# Patient Record
Sex: MALE | Race: WHITE | NOT HISPANIC OR LATINO | Employment: STUDENT | URBAN - METROPOLITAN AREA
[De-identification: names, ages, dates, MRNs, and addresses within clinical notes are randomized per-mention and may not be internally consistent; named-entity substitution may affect disease eponyms.]

---

## 2019-01-22 ENCOUNTER — TELEPHONE (OUTPATIENT)
Dept: FAMILY MEDICINE CLINIC | Facility: CLINIC | Age: 19
End: 2019-01-22

## 2019-01-22 NOTE — TELEPHONE ENCOUNTER
Patient would like to re-establish for mono testing.  Last seen May 2015.  2000.  [phone]. [facility] [address] [place] [id number]

## 2019-02-26 ENCOUNTER — OFFICE VISIT (OUTPATIENT)
Dept: FAMILY MEDICINE CLINIC | Facility: CLINIC | Age: 19
End: 2019-02-26

## 2019-02-26 VITALS
WEIGHT: 141.5 LBS | RESPIRATION RATE: 17 BRPM | HEIGHT: 70 IN | DIASTOLIC BLOOD PRESSURE: 80 MMHG | SYSTOLIC BLOOD PRESSURE: 118 MMHG | OXYGEN SATURATION: 98 % | TEMPERATURE: 98.4 F | HEART RATE: 103 BPM | BODY MASS INDEX: 20.26 KG/M2

## 2019-02-26 DIAGNOSIS — J06.9 UPPER RESPIRATORY TRACT INFECTION, UNSPECIFIED TYPE: ICD-10-CM

## 2019-02-26 DIAGNOSIS — R53.83 FATIGUE, UNSPECIFIED TYPE: Primary | ICD-10-CM

## 2019-02-26 PROCEDURE — 90632 HEPA VACCINE ADULT IM: CPT | Performed by: FAMILY MEDICINE

## 2019-02-26 PROCEDURE — 90471 IMMUNIZATION ADMIN: CPT | Performed by: FAMILY MEDICINE

## 2019-02-26 PROCEDURE — 99203 OFFICE O/P NEW LOW 30 MIN: CPT | Performed by: FAMILY MEDICINE

## 2019-02-27 LAB
25(OH)D3+25(OH)D2 SERPL-MCNC: 28.1 NG/ML
ALBUMIN SERPL-MCNC: 4.7 G/DL (ref 3.5–5.2)
ALBUMIN/GLOB SERPL: 1.7 G/DL
ALP SERPL-CCNC: 88 U/L (ref 40–129)
ALT SERPL-CCNC: 8 U/L (ref 5–41)
AST SERPL-CCNC: 18 U/L (ref 5–40)
BASOPHILS # BLD AUTO: 0.04 10*3/MM3 (ref 0–0.2)
BASOPHILS NFR BLD AUTO: 0.7 % (ref 0–1.5)
BILIRUB SERPL-MCNC: 0.5 MG/DL (ref 0.2–1.2)
BUN SERPL-MCNC: 10 MG/DL (ref 6–20)
BUN/CREAT SERPL: 12.2 (ref 7–25)
CALCIUM SERPL-MCNC: 9.4 MG/DL (ref 8.6–10.5)
CHLORIDE SERPL-SCNC: 102 MMOL/L (ref 98–107)
CO2 SERPL-SCNC: 29.5 MMOL/L (ref 22–29)
CREAT SERPL-MCNC: 0.82 MG/DL (ref 0.76–1.27)
EBV EA IGG SER-ACNC: <9 U/ML (ref 0–8.9)
EBV NA IGG SER IA-ACNC: <18 U/ML (ref 0–17.9)
EBV VCA IGG SER IA-ACNC: <18 U/ML (ref 0–17.9)
EBV VCA IGM SER IA-ACNC: <36 U/ML (ref 0–35.9)
EOSINOPHIL # BLD AUTO: 0.2 10*3/MM3 (ref 0–0.4)
EOSINOPHIL NFR BLD AUTO: 3.4 % (ref 0.3–6.2)
ERYTHROCYTE [DISTWIDTH] IN BLOOD BY AUTOMATED COUNT: 12.6 % (ref 12.3–15.4)
GLOBULIN SER CALC-MCNC: 2.7 GM/DL
GLUCOSE SERPL-MCNC: 65 MG/DL (ref 65–99)
HCT VFR BLD AUTO: 47.7 % (ref 37.5–51)
HGB BLD-MCNC: 16.2 G/DL (ref 13–17.7)
IMM GRANULOCYTES # BLD AUTO: 0.01 10*3/MM3 (ref 0–0.05)
IMM GRANULOCYTES NFR BLD AUTO: 0.2 % (ref 0–0.5)
LYMPHOCYTES # BLD AUTO: 1.57 10*3/MM3 (ref 0.7–3.1)
LYMPHOCYTES NFR BLD AUTO: 26.6 % (ref 19.6–45.3)
MCH RBC QN AUTO: 29.6 PG (ref 26.6–33)
MCHC RBC AUTO-ENTMCNC: 34 G/DL (ref 31.5–35.7)
MCV RBC AUTO: 87 FL (ref 79–97)
MONOCYTES # BLD AUTO: 0.71 10*3/MM3 (ref 0.1–0.9)
MONOCYTES NFR BLD AUTO: 12 % (ref 5–12)
NEUTROPHILS # BLD AUTO: 3.37 10*3/MM3 (ref 1.4–7)
NEUTROPHILS NFR BLD AUTO: 57.1 % (ref 42.7–76)
PLATELET # BLD AUTO: 225 10*3/MM3 (ref 140–450)
POTASSIUM SERPL-SCNC: 4.6 MMOL/L (ref 3.5–5.2)
PROT SERPL-MCNC: 7.4 G/DL (ref 6–8.5)
RBC # BLD AUTO: 5.48 10*6/MM3 (ref 4.14–5.8)
SERVICE CMNT-IMP: NORMAL
SODIUM SERPL-SCNC: 141 MMOL/L (ref 136–145)
WBC # BLD AUTO: 5.9 10*3/MM3 (ref 3.4–10.8)

## 2019-02-27 NOTE — PROGRESS NOTES
Subjective   Juan Manuel Elias is a 18 y.o. male with   Chief Complaint   Patient presents with   • Establish Care   • Labs Only     Mono Testing   .    History of Present Illness   18-year-old white male who has not been seen in this office for several years here with complaint of increased fatigue and exposure to infectious mononucleosis.  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  Health questionnaire is completed in its entirety on this date and reviewed.  Apparently he has had sore throat in the recent past but has had no fever, abdominal pain, nausea, vomiting or diarrhea.  The following portions of the patient's history were reviewed and updated as appropriate: allergies, current medications, past family history, past medical history, past social history, past surgical history and problem list.    Review of Systems   Constitutional: Positive for fatigue.   HENT: Positive for sore throat.    Respiratory: Negative for cough and shortness of breath.    Gastrointestinal: Negative for abdominal pain.   Musculoskeletal: Negative for myalgias.   All other systems reviewed and are negative.      Objective     Vitals:    02/26/19 1026   BP: 118/80   Pulse: 103   Resp: 17   Temp: 98.4 °F (36.9 °C)   SpO2: 98%       No results found for this or any previous visit (from the past 168 hour(s)).    Physical Exam   Constitutional: He is oriented to person, place, and time. He appears well-developed and well-nourished.   HENT:   Head: Normocephalic and atraumatic.   Right Ear: Hearing, tympanic membrane, external ear and ear canal normal.   Left Ear: Hearing, tympanic membrane, external ear and ear canal normal.   Nose: Nose normal.   Mouth/Throat: Uvula is midline, oropharynx is clear and moist and mucous membranes are normal.   Neck: Trachea normal and phonation normal. Neck supple. Normal carotid pulses present. Carotid bruit is not present. No thyroid mass and no thyromegaly present.    Cardiovascular: Normal rate, regular rhythm and normal heart sounds. Exam reveals no gallop and no friction rub.   No murmur heard.  Pulmonary/Chest: Effort normal and breath sounds normal. No respiratory distress. He has no decreased breath sounds. He has no wheezes. He has no rhonchi. He has no rales.   Lymphadenopathy:     He has no cervical adenopathy.   Neurological: He is alert and oriented to person, place, and time.   Skin: Skin is warm and dry. No rash noted.   Psychiatric: He has a normal mood and affect. His speech is normal and behavior is normal. Judgment and thought content normal. Cognition and memory are normal.   Nursing note and vitals reviewed.      Assessment/Plan   Juan Manuel was seen today for establish care and labs only.    Diagnoses and all orders for this visit:    Fatigue, unspecified type  -     CBC & Differential  -     Comprehensive Metabolic Panel  -     Vitamin D 25 Hydroxy  -     Santana-Barr Virus VCA Antibody Panel    Upper respiratory tract infection, unspecified type  -     CBC & Differential  -     Comprehensive Metabolic Panel  -     Vitamin D 25 Hydroxy  -     Santana-Barr Virus VCA Antibody Panel    Other orders  -     Hepatitis A Vaccine Adult IM        Return if symptoms worsen or fail to improve.

## 2023-11-07 ENCOUNTER — TELEPHONE (OUTPATIENT)
Dept: FAMILY MEDICINE CLINIC | Facility: CLINIC | Age: 23
End: 2023-11-07

## 2023-11-07 NOTE — TELEPHONE ENCOUNTER
Caller: Juan Manuel Elias    Relationship to patient: Self    Best call back number: [phone]    Chief complaint: GENERAL CHECK-UP    Type of visit: NEW PATIENT    If rescheduling, when is the original appointment: 11/20/2023 WITH MINNA BRENNAN HEAD.     Additional notes: THE PATIENT WAS PREVIOUSLY ESTABLISHED WITH DR. HIGUERA. HOWEVER, THE PATIENT HAS NOT BEEN SEEN IN OVER 3 YEARS. THE PATIENT WOULD LIKE TO KNOW IF DR. HIGUERA WOULD CONSIDER TAKING HIM BACK ON AS A NEW PATIENT. PLEASE ADVISE.

## 2023-11-20 ENCOUNTER — OFFICE VISIT (OUTPATIENT)
Dept: FAMILY MEDICINE CLINIC | Facility: CLINIC | Age: 23
End: 2023-11-20
Payer: COMMERCIAL

## 2023-11-20 ENCOUNTER — PATIENT ROUNDING (BHMG ONLY) (OUTPATIENT)
Dept: FAMILY MEDICINE CLINIC | Facility: CLINIC | Age: 23
End: 2023-11-20

## 2023-11-20 VITALS
WEIGHT: 149 LBS | DIASTOLIC BLOOD PRESSURE: 80 MMHG | HEART RATE: 100 BPM | TEMPERATURE: 97.5 F | BODY MASS INDEX: 21.33 KG/M2 | SYSTOLIC BLOOD PRESSURE: 128 MMHG | OXYGEN SATURATION: 98 % | HEIGHT: 70 IN

## 2023-11-20 DIAGNOSIS — R53.83 FATIGUE, UNSPECIFIED TYPE: ICD-10-CM

## 2023-11-20 DIAGNOSIS — Z13.29 THYROID DISORDER SCREENING: ICD-10-CM

## 2023-11-20 DIAGNOSIS — Z76.89 ENCOUNTER TO ESTABLISH CARE: Primary | ICD-10-CM

## 2023-11-20 DIAGNOSIS — S80.862A TICK BITE OF LEFT LOWER LEG, INITIAL ENCOUNTER: ICD-10-CM
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
## 2023-11-20 DIAGNOSIS — Z13.220 SCREENING FOR LIPID DISORDERS: ICD-10-CM

## 2023-11-20 NOTE — PROGRESS NOTES
Patient ID: Juan Manuel Elias is a 23 y.o. male     Patient Care Team:  Head, MINNA Butts as PCP - General (Nurse Practitioner)    Subjective     Chief Complaint   Patient presents with    Establish Care    Fatigue     X 2 months        Juan Manuel Elias presents to [facility] today to establish care with our office.  Previously seen Dr. Mehta years ago.       Fatigue  This is a new problem. The current episode started more than 1 month ago. The problem occurs daily. The problem has been unchanged. Associated symptoms include fatigue. Pertinent negatives include no anorexia, chills, coughing, fever, headaches, nausea, rash or sore throat. Nothing aggravates the symptoms.     Tick bite to left lower leg which occurred on 6/4/23.  Removed tick same day.      Gets about 8 hours of sleep each night. Will wake up tired and progresses throughout the day.  Does not feel well rested.  Feels like he needs a nap most days around 2-3 pm.    Unaware if snores.      Feels he has suffered from seasonal depression in the past however current symptoms started prior to when the weather changed which is unusually.  He does not feel he needs medication or counseling for depression at this time.      Diet:  Nothing specific.  Gets about 64 ounces of water most day.      Works at [employer].  .       He denies any complaints of fever, chills, cough, chest pain, shortness of air, abdominal pain, nausea, or any other concerns.     The following portions of the patient's history were reviewed and updated as appropriate: allergies, current medications, past family history, past medical history, past social history, past surgical history and problem list.       Review of Systems   Constitutional: Positive for fatigue. Negative for chills and fever.   HENT:  Negative for sore throat.    Respiratory:  Negative for cough.    Skin:  Negative for rash.   Gastrointestinal:  Negative for anorexia and  nausea.   Neurological:  Negative for headaches.       Vitals:    11/20/23 0916   BP: 128/80   Pulse: 100   Temp: 97.5 °F (36.4 °C)   SpO2: 98%       Documented weights    11/20/23 0916   Weight: 67.6 kg (149 lb)     Body mass index is 21.69 kg/m².    Results for orders placed or performed in visit on 02/26/19   Comprehensive Metabolic Panel    Specimen: Blood   Result Value Ref Range    Glucose 65 65 - 99 mg/dL    BUN 10 6 - 20 mg/dL    Creatinine 0.82 0.76 - 1.27 mg/dL    eGFR Non African Am 120 >60 mL/min/1.73    eGFR African Am 145 >60 mL/min/1.73    BUN/Creatinine Ratio 12.2 7.0 - 25.0    Sodium 141 136 - 145 mmol/L    Potassium 4.6 3.5 - 5.2 mmol/L    Chloride 102 98 - 107 mmol/L    Total CO2 29.5 (H) 22.0 - 29.0 mmol/L    Calcium 9.4 8.6 - 10.5 mg/dL    Total Protein 7.4 6.0 - 8.5 g/dL    Albumin 4.70 3.50 - 5.20 g/dL    Globulin 2.7 gm/dL    A/G Ratio 1.7 g/dL    Total Bilirubin 0.5 0.2 - 1.2 mg/dL    Alkaline Phosphatase 88 40 - 129 U/L    AST (SGOT) 18 5 - 40 U/L    ALT (SGPT) 8 5 - 41 U/L   Vitamin D 25 Hydroxy    Specimen: Blood   Result Value Ref Range    25 Hydroxy, Vitamin D 28.1 ng/ml   Santana-Barr Virus VCA Antibody Panel    Specimen: Blood   Result Value Ref Range    EBV VCA IgM <36.0 0.0 - 35.9 U/mL    EBV Early Antigen Ab, IgG <9.0 0.0 - 8.9 U/mL    EBV VCA IgG <18.0 0.0 - 17.9 U/mL    EBV Nuclear Antigen Ab, IgG <18.0 0.0 - 17.9 U/mL    Interpretation Comment    CBC & Differential    Specimen: Blood   Result Value Ref Range    WBC 5.90 3.40 - 10.80 10*3/mm3    RBC 5.48 4.14 - 5.80 10*6/mm3    Hemoglobin 16.2 13.0 - 17.7 g/dL    Hematocrit 47.7 37.5 - 51.0 %    MCV 87.0 79.0 - 97.0 fL    MCH 29.6 26.6 - 33.0 pg    MCHC 34.0 31.5 - 35.7 g/dL    RDW 12.6 12.3 - 15.4 %    Platelets 225 140 - 450 10*3/mm3    Neutrophil Rel % 57.1 42.7 - 76.0 %    Lymphocyte Rel % 26.6 19.6 - 45.3 %    Monocyte Rel % 12.0 5.0 - 12.0 %    Eosinophil Rel % 3.4 0.3 - 6.2 %    Basophil Rel % 0.7 0.0 - 1.5 %     Neutrophils Absolute 3.37 1.40 - 7.00 10*3/mm3    Lymphocytes Absolute 1.57 0.70 - 3.10 10*3/mm3    Monocytes Absolute 0.71 0.10 - 0.90 10*3/mm3    Eosinophils Absolute 0.20 0.00 - 0.40 10*3/mm3    Basophils Absolute 0.04 0.00 - 0.20 10*3/mm3    Immature Granulocyte Rel % 0.2 0.0 - 0.5 %    Immature Grans Absolute 0.01 0.00 - 0.05 10*3/mm3     Patient screened positive for depression based on a PHQ-9 score of 9 on 11/20/2023. Follow-up recommendations include: Elevated PHQ score reflective of acute illness, not depression.        Objective     Physical Exam  Vitals reviewed.   Constitutional:       General: He is not in acute distress.     Appearance: He is well-developed.   HENT:      Head: Normocephalic and atraumatic.      Right Ear: Tympanic membrane normal.      Left Ear: Tympanic membrane normal.   Eyes:      Pupils: Pupils are equal, round, and reactive to light.   Cardiovascular:      Rate and Rhythm: Normal rate and regular rhythm.      Heart sounds: Normal heart sounds. No murmur heard.  Pulmonary:      Effort: Pulmonary effort is normal.      Breath sounds: Normal breath sounds. No wheezing, rhonchi or rales.   Abdominal:      Palpations: Abdomen is soft.   Musculoskeletal:         General: No tenderness. Normal range of motion.      Cervical back: Normal range of motion and neck supple.   Skin:     General: Skin is warm and dry.      Comments: 5 mm pink scar area to medial left lower leg   Neurological:      Mental Status: He is alert and oriented to person, place, and time.   Psychiatric:         Behavior: Behavior normal.       BMI is within normal parameters. No other follow-up for BMI required.    Assessment & Plan     Assessment/Plan     Diagnoses and all orders for this visit:    1. Encounter to establish care (Primary)    2. Fatigue, unspecified type  -     CBC (No Diff)  -     Comprehensive Metabolic Panel  -     Lipid Panel With / Chol / HDL Ratio  -     TSH Rfx On Abnormal To Free T4  -     UA  / M With / Rflx Culture([facility] ONLY) - Urine, Clean Catch  -     SARS-CoV-2 Antibodies, Nucleocapsid (Natural Immunity)  -     Lyme Disease Total Antibody With Reflex to Immunoassay    3. Tick bite of left lower leg, initial encounter  -     Lyme Disease Total Antibody With Reflex to Immunoassay    4. Screening for lipid disorders  -     Lipid Panel With / Chol / HDL Ratio    5. Thyroid disorder screening  -     TSH Rfx On Abnormal To Free T4      Summary:  Juan Manuel Elias has been suffering from chronic fatigue for the past couple of months.  Advised we will start off with checking blood work first to determine further recommendations.    Follow Up:  Return if symptoms worsen or fail to improve, for Labs today.    In the meantime, instructed to contact us sooner for any problems or concerns.    Patient was given instructions and counseling regarding condition or for health maintenance advice.  Please see specific information pulled into the AVS if appropriate.          Carmen Garcia, APRN  Family Medicine  [facility]  11/20/23  10:36 EST

## 2023-11-21 LAB
ALBUMIN SERPL-MCNC: 4.8 G/DL (ref 4.3–5.2)
ALBUMIN/GLOB SERPL: 2.2 {RATIO} (ref 1.2–2.2)
ALP SERPL-CCNC: 76 IU/L (ref 44–121)
ALT SERPL-CCNC: 13 IU/L (ref 0–44)
APPEARANCE UR: CLEAR
AST SERPL-CCNC: 25 IU/L (ref 0–40)
B BURGDOR IGG+IGM SER QL IA: NEGATIVE
BACTERIA #/AREA URNS HPF: ABNORMAL /[HPF]
BILIRUB SERPL-MCNC: 0.3 MG/DL (ref 0–1.2)
BILIRUB UR QL STRIP: NEGATIVE
BUN SERPL-MCNC: 13 MG/DL (ref 6–20)
BUN/CREAT SERPL: 14 (ref 9–20)
CALCIUM SERPL-MCNC: 9.8 MG/DL (ref 8.7–10.2)
CASTS URNS QL MICRO: ABNORMAL /LPF
CHLORIDE SERPL-SCNC: 102 MMOL/L (ref 96–106)
CHOLEST SERPL-MCNC: 135 MG/DL (ref 100–199)
CHOLEST/HDLC SERPL: 3.6 RATIO (ref 0–5)
CO2 SERPL-SCNC: 26 MMOL/L (ref 20–29)
COLOR UR: YELLOW
CREAT SERPL-MCNC: 0.93 MG/DL (ref 0.76–1.27)
CRYSTALS URNS MICRO: ABNORMAL
EGFRCR SERPLBLD CKD-EPI 2021: 117 ML/MIN/1.73
EPI CELLS #/AREA URNS HPF: ABNORMAL /HPF (ref 0–10)
ERYTHROCYTE [DISTWIDTH] IN BLOOD BY AUTOMATED COUNT: 12.4 % (ref 11.6–15.4)
GLOBULIN SER CALC-MCNC: 2.2 G/DL (ref 1.5–4.5)
GLUCOSE SERPL-MCNC: 78 MG/DL (ref 70–99)
GLUCOSE UR QL STRIP: NEGATIVE
HCT VFR BLD AUTO: 49.9 % (ref 37.5–51)
HDLC SERPL-MCNC: 38 MG/DL
HGB BLD-MCNC: 16.8 G/DL (ref 13–17.7)
HGB UR QL STRIP: NEGATIVE
KETONES UR QL STRIP: NEGATIVE
LDLC SERPL CALC-MCNC: 82 MG/DL (ref 0–99)
LEUKOCYTE ESTERASE UR QL STRIP: NEGATIVE
MCH RBC QN AUTO: 30.3 PG (ref 26.6–33)
MCHC RBC AUTO-ENTMCNC: 33.7 G/DL (ref 31.5–35.7)
MCV RBC AUTO: 90 FL (ref 79–97)
MICRO URNS: NORMAL
MICRO URNS: NORMAL
MUCOUS THREADS URNS QL MICRO: PRESENT
NITRITE UR QL STRIP: NEGATIVE
PH UR STRIP: 5.5 [PH] (ref 5–7.5)
PLATELET # BLD AUTO: 250 X10E3/UL (ref 150–450)
POTASSIUM SERPL-SCNC: 4.9 MMOL/L (ref 3.5–5.2)
PROT SERPL-MCNC: 7 G/DL (ref 6–8.5)
PROT UR QL STRIP: NEGATIVE
RBC # BLD AUTO: 5.54 X10E6/UL (ref 4.14–5.8)
RBC #/AREA URNS HPF: ABNORMAL /HPF (ref 0–2)
SARS-COV-2 AB SERPL QL IA: POSITIVE
SODIUM SERPL-SCNC: 140 MMOL/L (ref 134–144)
SP GR UR STRIP: 1.03 (ref 1–1.03)
TRIGL SERPL-MCNC: 72 MG/DL (ref 0–149)
TSH SERPL DL<=0.005 MIU/L-ACNC: 0.89 UIU/ML (ref 0.45–4.5)
UNIDENT CRYS URNS QL MICRO: PRESENT
URINALYSIS REFLEX: NORMAL
UROBILINOGEN UR STRIP-MCNC: 0.2 MG/DL (ref 0.2–1)
VLDLC SERPL CALC-MCNC: 15 MG/DL (ref 5–40)
WBC # BLD AUTO: 7.7 X10E3/UL (ref 3.4–10.8)
WBC #/AREA URNS HPF: ABNORMAL /HPF (ref 0–5)

## 2024-04-29 ENCOUNTER — OFFICE VISIT (OUTPATIENT)
Dept: FAMILY MEDICINE CLINIC | Facility: CLINIC | Age: 24
End: 2024-04-29
Payer: COMMERCIAL

## 2024-04-29 VITALS
HEART RATE: 84 BPM | WEIGHT: 146 LBS | BODY MASS INDEX: 20.9 KG/M2 | TEMPERATURE: 97.8 F | HEIGHT: 70 IN | SYSTOLIC BLOOD PRESSURE: 120 MMHG | DIASTOLIC BLOOD PRESSURE: 80 MMHG | OXYGEN SATURATION: 95 %

## 2024-04-29 DIAGNOSIS — R10.9 FLANK PAIN: Primary | ICD-10-CM

## 2024-04-29 DIAGNOSIS — R53.82 CHRONIC FATIGUE: ICD-10-CM

## 2024-04-29 LAB
BILIRUB BLD-MCNC: NEGATIVE MG/DL
CLARITY, POC: CLEAR
COLOR UR: YELLOW
GLUCOSE UR STRIP-MCNC: NEGATIVE MG/DL
KETONES UR QL: NEGATIVE
LEUKOCYTE EST, POC: NEGATIVE
NITRITE UR-MCNC: NEGATIVE MG/ML
PH UR: 6.5 [PH] (ref 5–8)
PROT UR STRIP-MCNC: NEGATIVE MG/DL
RBC # UR STRIP: NEGATIVE /UL
SP GR UR: 1.02 (ref 1–1.03)
UROBILINOGEN UR QL: NORMAL

## 2024-04-29 PROCEDURE — 81002 URINALYSIS NONAUTO W/O SCOPE: CPT | Performed by: NURSE PRACTITIONER

## 2024-04-29 PROCEDURE — 99213 OFFICE O/P EST LOW 20 MIN: CPT | Performed by: NURSE PRACTITIONER

## 2024-04-29 NOTE — PROGRESS NOTES
Patient ID: Juan Manuel Elias is a 23 y.o. male     Patient Care Team:  Head, MINNA Butts as PCP - General (Nurse Practitioner)    Subjective     Chief Complaint   Patient presents with    Flank Pain     Bilateral  x3 weeks       History of Present Illness    Juan Manuel Elias presents to [facility] today for problems with bilateral lower abdomen pain and bilateral flank pain.  No known injury.  Works at desk job.  No recent heavy lifting, pushing,pulling.    Does not seem to correlate with any certain foods except for increased flank pain after eating carbs.  Denies any nausea or vomiting.  No blood in stool.  He does not feel he suffers from constipation or diarrhea however he will feel the need to still have a bowel movement regularly despite just having 1.  Has not tried any home remedies when the discomfort occurs.  Usually passes on its own.  Seen back in November 2023 as a new patient.  Had complaints of chronic fatigue.  Did a full panel of blood work which were stable.  States he continues to have problems with this.  Gets about 8 hours of sleep each night. Will wake up tired and progresses throughout the day.  Does not feel well rested.  Feels like he needs a nap most days around 2-3 pm.    Unaware if snores.    Drinks at least 64 ounces of water a day.    He denies any complaints of fever, chills, cough, chest pain, shortness of air, abdominal pain, nausea, or any other concerns.     The following portions of the patient's history were reviewed and updated as appropriate: allergies, current medications, past family history, past medical history, past social history, past surgical history and problem list.       ROS    Vitals:    04/29/24 1524   BP: 120/80   Pulse: 84   Temp: 97.8 °F (36.6 °C)   SpO2: 95%       Documented weights    04/29/24 1524   Weight: 66.2 kg (146 lb)     Body mass index is 21.25 kg/m².    Results for orders placed or performed in visit on 04/29/24    POCT urinalysis dipstick, manual    Specimen: Urine   Result Value Ref Range    Color Yellow Yellow, Straw, Dark Yellow, Autumn    Clarity, UA Clear Clear    Glucose, UA Negative Negative mg/dL    Bilirubin Negative Negative    Ketones, UA Negative Negative    Specific Gravity  1.020 1.005 - 1.030    Blood, UA Negative Negative    pH, Urine 6.5 5.0 - 8.0    Protein, POC Negative Negative mg/dL    Urobilinogen, UA Normal Normal, 0.2 E.U./dL    Leukocytes Negative Negative    Nitrite, UA Negative Negative           Objective     Physical Exam  Vitals reviewed.   Constitutional:       General: He is not in acute distress.  HENT:      Head: Normocephalic and atraumatic.   Cardiovascular:      Rate and Rhythm: Normal rate and regular rhythm.      Heart sounds: No murmur heard.  Pulmonary:      Effort: Pulmonary effort is normal.      Breath sounds: Normal breath sounds. No wheezing.   Abdominal:      Palpations: There is no hepatomegaly or splenomegaly.      Tenderness: There is no abdominal tenderness.          Comments: Abdomen pain more localized to bilateral lateral aspects of abdomen wall.  No pain currently.     Musculoskeletal:      Thoracic back: Normal.      Lumbar back: Normal. No swelling, edema or tenderness. Normal range of motion. Negative right straight leg raise test and negative left straight leg raise test.        Back:       Comments: Pain located lateral lower back when occurs.  None currently.     Neurological:      Mental Status: He is alert.         BMI is within normal parameters. No other follow-up for BMI required.      Assessment & Plan     Assessment/Plan     Diagnoses and all orders for this visit:    1. Flank pain (Primary)  -     POCT urinalysis dipstick, manual   Urinalysis negative.  Exam negative today.  Pain more lateral abdomen and low back.  Advised to continue to monitor and keep log of activities when discomfort should occur.      2. Chronic fatigue  -     Ambulatory Referral to  Sleep Medicine        Follow Up:  Return if symptoms worsen or fail to improve.    In the meantime, instructed to contact us sooner for any problems or concerns.    Patient was given instructions and counseling regarding condition or for health maintenance advice.  Please see specific information pulled into the AVS if appropriate.          Carmen Garcia, APRN  Family Medicine  [facility] [place]  04/29/24  15:49 EDT

## 2024-06-11 ENCOUNTER — OFFICE VISIT (OUTPATIENT)
Dept: SLEEP MEDICINE | Facility: HOSPITAL | Age: 24
End: 2024-06-11
Payer: COMMERCIAL

## 2024-06-11 VITALS
DIASTOLIC BLOOD PRESSURE: 71 MMHG | OXYGEN SATURATION: 98 % | HEART RATE: 78 BPM | HEIGHT: 70 IN | SYSTOLIC BLOOD PRESSURE: 120 MMHG | BODY MASS INDEX: 20.9 KG/M2 | WEIGHT: 146 LBS

## 2024-06-11 DIAGNOSIS — R53.82 CHRONIC FATIGUE: Primary | ICD-10-CM

## 2024-06-11 DIAGNOSIS — G47.33 OBSTRUCTIVE SLEEP APNEA: ICD-10-CM

## 2024-06-11 PROCEDURE — G0463 HOSPITAL OUTPT CLINIC VISIT: HCPCS

## 2024-06-11 NOTE — PROGRESS NOTES
SLEEP/PULMONARY  CLINIC NOTE      PATIENT IDENTIFICATION:  Name: Juan Manuel Elias  Age: 23 y.o.  Sex: male  :  2000  MRN: [id number]    DATE OF CONSULTATION:  2024                     CHIEF COMPLAINT: fatigue     History of Present Illness:   Juan Manuel Elias is a 23 y.o. male Pt with still multiple wakening up at night with sleepiness fatigue and snoring, witnessed apnea, Hard  to get up in the morning. Daytime fatigue sleepiness loss of energy, Cranks score of ( 14)       Review of Systems:   Constitutional: As above   Eyes: negative   ENT/oropharynx: negative   Cardiovascular: negative   Respiratory: negative   Gastrointestinal: negative   Genitourinary: negative   Neurological: negative   Musculoskeletal: negative   Integument/breast: negative   Endocrine: negative   Allergic/Immunologic: negative     Past Medical History:  Past Medical History:   Diagnosis Date    History of chicken pox        Past Surgical History:  Past Surgical History:   Procedure Laterality Date    WISDOM TOOTH EXTRACTION          Family History:  Family History   Problem Relation Age of Onset    Osteoarthritis Mother     Diabetes Maternal Grandmother     Hypertension Maternal Grandmother     Stroke Maternal Grandmother     Arthritis Maternal Grandmother     Cancer Maternal Grandfather         Liver    Diabetes Maternal Grandfather     Heart attack Maternal Grandfather     Hypertension Maternal Grandfather     Arthritis Maternal Grandfather     Heart attack Paternal Grandfather         Social History:   Social History     Tobacco Use    Smoking status: Never    Smokeless tobacco: Never   Substance Use Topics    Alcohol use: No        Allergies:  Allergies   Allergen Reactions    Pineapple Other (See Comments)     Tongue swells slightly        Home Meds:  (Not in a hospital admission)      Objective:    Vitals Ranges:   Heart Rate:  [78] 78  BP: (120)/(71) 120/71  Body mass index is 20.95 kg/m².     Exam:  General  Appearance:  WDWN    HEENT:   without obvious abnormality,  Conjunctiva/corneas clear,  Normal external ear canals, no drainage    Clear orsalmucosa,  Mallampati score 3    Neck:  Supple, symmetrical, trachea midline. No JVD.  Lungs:   Bilateral basal rhonchi bilaterally, respirations unlabored symmetrical wall movement.    Chest wall:  No tenderness or deformity.    Heart:  Regular rate and rhythm, S1 and S2 normal.  Extremities: Trace edema no clubbing or Cyanosis        Data Review:  All labs (24hrs): No results found for this or any previous visit (from the past 24 hour(s)).     Imaging:  X-RAY THORACIC SPINE 3 VIEWS  INDICATION: Back pain.     FINDINGS: AP, lateral, and swimmer's views of the thoracic spine without  comparison. Vertebral body height and alignment is normal. No fracture  or subluxation. Disc spaces are normal. No vertebral body anomalies     IMPRESSION-  Normal thoracic spine radiographs.             Reading Barbara GOLDSTEIN       Releasing Barbara GOLDSTEIN       Released Date Time- 05/04/15 1508       - MANN   ------------------------------------------------------------------------------       ASSESSMENT:  Diagnoses and all orders for this visit:    Chronic fatigue    Obstructive sleep apnea        PLAN:   This is patient with symptoms of obstructive sleep apnea, NPSG study ASAP / split night study, Avoid supine avoid sedative meds in pm, weight loss, Avoid driving. Long discussion with patient about the physiology of JESSICA, and long term and short term   benefit of treating JESSICA        Follow-up after sleep study    Jenna Gavin MD. D, ABSM.  6/11/2024  13:34 EDT

## 2024-06-21 ENCOUNTER — OFFICE VISIT (OUTPATIENT)
Dept: FAMILY MEDICINE CLINIC | Facility: CLINIC | Age: 24
End: 2024-06-21
Payer: COMMERCIAL

## 2024-06-21 VITALS
OXYGEN SATURATION: 99 % | SYSTOLIC BLOOD PRESSURE: 120 MMHG | WEIGHT: 147 LBS | HEART RATE: 74 BPM | DIASTOLIC BLOOD PRESSURE: 70 MMHG | BODY MASS INDEX: 21.05 KG/M2 | HEIGHT: 70 IN | TEMPERATURE: 98.4 F

## 2024-06-21 DIAGNOSIS — R31.9 HEMATURIA, UNSPECIFIED TYPE: Primary | ICD-10-CM

## 2024-06-21 LAB
BILIRUB BLD-MCNC: NEGATIVE MG/DL
CLARITY, POC: CLEAR
COLOR UR: YELLOW
GLUCOSE UR STRIP-MCNC: NEGATIVE MG/DL
KETONES UR QL: NEGATIVE
LEUKOCYTE EST, POC: NEGATIVE
NITRITE UR-MCNC: NEGATIVE MG/ML
PH UR: 6.5 [PH] (ref 5–8)
PROT UR STRIP-MCNC: NEGATIVE MG/DL
RBC # UR STRIP: NEGATIVE /UL
SP GR UR: 1.01 (ref 1–1.03)
UROBILINOGEN UR QL: NORMAL

## 2024-06-21 PROCEDURE — 81002 URINALYSIS NONAUTO W/O SCOPE: CPT | Performed by: NURSE PRACTITIONER

## 2024-06-21 PROCEDURE — 99213 OFFICE O/P EST LOW 20 MIN: CPT | Performed by: NURSE PRACTITIONER

## 2024-06-21 NOTE — PROGRESS NOTES
Patient ID: Juan Manuel Elias is a 23 y.o. male     Patient Care Team:  Head, MINNA Butts as PCP - General (Nurse Practitioner)    Subjective     Chief Complaint   Patient presents with    Blood in Urine     Saturday noticed blood- no pain, nothing since then        History of Present Illness    Juan Manuel Elias presents to [facility] today for complaints of having dark red-colored urine which he first noted on Saturday and early Sunday morning.  He did not have any pain with this.  No back pain or any other symptoms.  No recent intake of any red-colored food or liquids.  No new medications.  He increase his fluid intake and noticed improvement of symptoms.    He denies any complaints of fever, chills, cough, chest pain, shortness of air, abdominal pain, nausea, or any other concerns.     The following portions of the patient's history were reviewed and updated as appropriate: allergies, current medications, past family history, past medical history, past social history, past surgical history and problem list.       ROS    Vitals:    06/21/24 1411   BP: 120/70   Pulse: 74   Temp: 98.4 °F (36.9 °C)   SpO2: 99%       Documented weights    06/21/24 1411   Weight: 66.7 kg (147 lb)     Body mass index is 21.09 kg/m².    Results for orders placed or performed in visit on 06/21/24   POCT urinalysis dipstick, manual    Specimen: Urine   Result Value Ref Range    Color Yellow Yellow, Straw, Dark Yellow, Autumn    Clarity, UA Clear Clear    Glucose, UA Negative Negative mg/dL    Bilirubin Negative Negative    Ketones, UA Negative Negative    Specific Gravity  1.015 1.005 - 1.030    Blood, UA Negative Negative    pH, Urine 6.5 5.0 - 8.0    Protein, POC Negative Negative mg/dL    Urobilinogen, UA Normal Normal, 0.2 E.U./dL    Leukocytes Negative Negative    Nitrite, UA Negative Negative           Objective     Physical Exam  Vitals reviewed.   Constitutional:       General: He is not in acute  distress.  HENT:      Head: Normocephalic and atraumatic.   Cardiovascular:      Rate and Rhythm: Normal rate.   Pulmonary:      Effort: Pulmonary effort is normal.   Abdominal:      General: Bowel sounds are normal. There is no distension.      Palpations: Abdomen is soft. There is no hepatomegaly or splenomegaly.      Tenderness: There is no abdominal tenderness. There is no right CVA tenderness or left CVA tenderness.   Musculoskeletal:      Right lower leg: No edema.      Left lower leg: No edema.   Neurological:      Mental Status: He is alert and oriented to person, place, and time.         BMI is within normal parameters. No other follow-up for BMI required.      Assessment & Plan     Assessment/Plan     Diagnoses and all orders for this visit:    1. Hematuria, unspecified type (Primary)  -     POCT urinalysis dipstick, manual   Urinalysis completed in office today is negative.  No signs of blood or any other abnormalities.  Exam is also normal.  Reassuring no longer having symptoms.  Advise continue to increase fluid intake.  I also sent him home with a urine specimen cup.  Instructed if symptoms should reoccur, to collect specimen and drop it off for further testing.  Patient verbalized understanding.        Follow Up:  Return if symptoms worsen or fail to improve.    In the meantime, instructed to contact us sooner for any problems or concerns.    Patient was given instructions and counseling regarding condition or for health maintenance advice.  Please see specific information pulled into the AVS if appropriate.          Carmen Garcia, APRN  Family Medicine  [facility] [place]  06/21/24  14:41 EDT

## 2024-10-21 ENCOUNTER — HOSPITAL ENCOUNTER (OUTPATIENT)
Dept: GENERAL RADIOLOGY | Facility: HOSPITAL | Age: 24
Discharge: HOME | End: 2024-10-21
Admitting: NURSE PRACTITIONER
Payer: COMMERCIAL

## 2024-10-21 ENCOUNTER — OFFICE VISIT (OUTPATIENT)
Dept: FAMILY MEDICINE CLINIC | Facility: CLINIC | Age: 24
End: 2024-10-21
Payer: COMMERCIAL

## 2024-10-21 VITALS
HEIGHT: 70 IN | TEMPERATURE: 98.2 F | DIASTOLIC BLOOD PRESSURE: 80 MMHG | BODY MASS INDEX: 21.47 KG/M2 | WEIGHT: 150 LBS | OXYGEN SATURATION: 99 % | HEART RATE: 96 BPM | SYSTOLIC BLOOD PRESSURE: 120 MMHG

## 2024-10-21 DIAGNOSIS — R07.89 MIDSTERNAL CHEST PAIN: Primary | ICD-10-CM

## 2024-10-21 PROCEDURE — 93000 ELECTROCARDIOGRAM COMPLETE: CPT | Performed by: NURSE PRACTITIONER

## 2024-10-21 PROCEDURE — 99213 OFFICE O/P EST LOW 20 MIN: CPT | Performed by: NURSE PRACTITIONER

## 2024-10-21 PROCEDURE — 71046 X-RAY EXAM CHEST 2 VIEWS: CPT

## 2024-10-21 NOTE — PROGRESS NOTES
Patient ID: Juan Manuel Elias is a 24 y.o. male     Patient Care Team:  Head, MINNA Butts as PCP - General (Nurse Practitioner)    Subjective     Chief Complaint   Patient presents with    Chest Pain     Pressure -getting worse since beginning of the month, no pain just tightness.          Juan Manuel Elias presents to [facility] today for mid-sternal chest pain.      Chest Pain   This is a new problem. The current episode started more than 1 year ago. The onset quality is gradual. The problem occurs constantly (Started about 1 year ago which was intermittent however has now been constant discomfort over past month.). The problem has been gradually worsening. The pain is present in the substernal region. The pain is mild. The quality of the pain is described as dull. The pain does not radiate. Associated symptoms include headaches (left sided head pressure over past month), palpitations and shortness of breath. Pertinent negatives include no abdominal pain, back pain, cough, dizziness, fever, irregular heartbeat, lower extremity edema, malaise/fatigue, nausea, syncope or vomiting. The pain is aggravated by nothing. He has tried nothing for the symptoms. Risk factors include male gender (No increased stressors.  No recent travel or surgery.).   Pertinent negatives for past medical history include no anxiety/panic attacks.   His family medical history is significant for CAD (grandfather).     Rates pain at 1-2 on 0-10 scale. Stays at 1-2 at worst.    Affects sleep at times.    Does not seem to be affected by certain foods.  Will have occasional symptoms of indigestion.      He denies any complaints of fever, chills, cough, abdominal pain, nausea, or any other concerns.     The following portions of the patient's history were reviewed and updated as appropriate: allergies, current medications, past family history, past medical history, past social history, past surgical history and  problem list.       Review of Systems   Constitutional: Negative for fever and malaise/fatigue.   Cardiovascular:  Positive for chest pain and palpitations. Negative for irregular heartbeat and syncope.   Respiratory:  Positive for shortness of breath. Negative for cough.    Musculoskeletal:  Negative for back pain.   Gastrointestinal:  Negative for abdominal pain, nausea and vomiting.   Neurological:  Positive for headaches (left sided head pressure over past month). Negative for dizziness.       Vitals:    10/21/24 0845   BP: 120/80   Pulse: 96   Temp: 98.2 °F (36.8 °C)   SpO2: 99%       Documented weights    10/21/24 0845   Weight: 68 kg (150 lb)     Body mass index is 21.52 kg/m².    Results for orders placed or performed in visit on 06/21/24   POCT urinalysis dipstick, manual    Collection Time: 06/21/24  2:34 PM    Specimen: Urine   Result Value Ref Range    Color Yellow Yellow, Straw, Dark Yellow, Autumn    Clarity, UA Clear Clear    Glucose, UA Negative Negative mg/dL    Bilirubin Negative Negative    Ketones, UA Negative Negative    Specific Gravity  1.015 1.005 - 1.030    Blood, UA Negative Negative    pH, Urine 6.5 5.0 - 8.0    Protein, POC Negative Negative mg/dL    Urobilinogen, UA Normal Normal, 0.2 E.U./dL    Leukocytes Negative Negative    Nitrite, UA Negative Negative           Objective     Physical Exam  Vitals reviewed.   Constitutional:       General: He is not in acute distress.  Cardiovascular:      Rate and Rhythm: Normal rate and regular rhythm.      Heart sounds: No murmur heard.  Pulmonary:      Effort: Pulmonary effort is normal.      Breath sounds: Normal breath sounds. No wheezing or rhonchi.   Musculoskeletal:      Right lower leg: No edema.      Left lower leg: No edema.   Neurological:      Mental Status: He is alert and oriented to person, place, and time.   Psychiatric:         Mood and Affect: Mood normal.         ECG 12 Lead    Date/Time: 10/21/2024 9:21 AM  Performed by: Jose  MINNA Butts    Authorized by: Carmen Garcia APRN  Previous ECG: no previous ECG available  Rhythm: sinus rhythm  Rate: normal  Conduction: conduction normal  ST Segments: ST segments normal  T Waves: T waves normal  QRS axis: normal  Other: no other findings    Clinical impression: normal ECG        BMI is within normal parameters. No other follow-up for BMI required.      Assessment & Plan     Assessment/Plan     Diagnoses and all orders for this visit:    1. Midsternal chest pain (Primary)  -     ECG 12 Lead  -     XR Chest PA & Lateral    Physical exam appears stable.  EKG shows normal sinus rhythm.  Advised next up will be chest x-ray for further evaluation.  I have explained if the chest x-ray is normal, we will focus on possibly symptoms related to indigestion or reflux disease and go from there.  Instructed we will notify him of chest x-ray results today which will determine further recommendations    Follow Up:  Return if symptoms worsen or fail to improve.    In the meantime, instructed to contact us sooner for any problems or concerns.    Patient was given instructions and counseling regarding condition or for health maintenance advice.  Please see specific information pulled into the AVS if appropriate.          MINNA Chavez  Family Medicine  [facility] – [place], [place]  10/21/24  09:27 EDT